# Patient Record
Sex: MALE | Race: WHITE | NOT HISPANIC OR LATINO | URBAN - METROPOLITAN AREA
[De-identification: names, ages, dates, MRNs, and addresses within clinical notes are randomized per-mention and may not be internally consistent; named-entity substitution may affect disease eponyms.]

---

## 2020-08-26 ENCOUNTER — APPOINTMENT (RX ONLY)
Dept: URBAN - METROPOLITAN AREA CLINIC 34 | Facility: CLINIC | Age: 25
Setting detail: DERMATOLOGY
End: 2020-08-26

## 2020-08-26 VITALS
TEMPERATURE: 97.3 F | DIASTOLIC BLOOD PRESSURE: 73 MMHG | SYSTOLIC BLOOD PRESSURE: 112 MMHG | WEIGHT: 169 LBS | HEIGHT: 70 IN | HEART RATE: 66 BPM

## 2020-08-26 DIAGNOSIS — B07.8 OTHER VIRAL WARTS: ICD-10-CM

## 2020-08-26 DIAGNOSIS — B07.0 PLANTAR WART: ICD-10-CM

## 2020-08-26 PROBLEM — Z11.59 ENCOUNTER FOR SCREENING FOR OTHER VIRAL DISEASES: Status: ACTIVE | Noted: 2020-08-26

## 2020-08-26 PROCEDURE — ? ADDITIONAL NOTES

## 2020-08-26 PROCEDURE — ? COUNSELING

## 2020-08-26 PROCEDURE — 99202 OFFICE O/P NEW SF 15 MIN: CPT

## 2020-08-26 ASSESSMENT — LOCATION ZONE DERM
LOCATION ZONE: HAND
LOCATION ZONE: FINGER
LOCATION ZONE: FEET

## 2020-08-26 ASSESSMENT — LOCATION SIMPLE DESCRIPTION DERM
LOCATION SIMPLE: LEFT HAND
LOCATION SIMPLE: LEFT FOOT
LOCATION SIMPLE: RIGHT THUMB
LOCATION SIMPLE: LEFT INDEX FINGER

## 2020-08-26 ASSESSMENT — LOCATION DETAILED DESCRIPTION DERM
LOCATION DETAILED: LEFT LATERAL PLANTAR FOOT
LOCATION DETAILED: PERIUNGUAL SKIN RIGHT THUMB
LOCATION DETAILED: LEFT PROXIMAL PALMAR INDEX FINGER
LOCATION DETAILED: LEFT RADIAL PALM

## 2020-08-26 NOTE — PROCEDURE: COUNSELING
Patient Specific Counseling (Will Not Stick From Patient To Patient): Reviewed treatment options see notes under verruca vulgaris.
Detail Level: Detailed
Patient Specific Counseling (Will Not Stick From Patient To Patient): Verruca types reviewed and process - also discussed how resilient these can be.  He has one on the foot . Also how they can spread.  He tried OTC only 1 x- and it didn't work - reviewed resiliency and persistence and time.  Discussed soaking and trimming/filing before treatments . \\n\\nSalicylic acid gel - Common warts in young children can be treated at home by their parents on a daily basis by applying salicylic acid gel, solution, or plaster. There is usually little discomfort, but it can take many weeks of treatment to obtain favorable results. Treatment should be stopped at least temporarily if the wart becomes sore.\\n\\nCantharidin - Warts may also be treated by painting with cantharidin in the dermatologists office. Cantharidin causes a blister to form under the wart. The dermatologist can then clip away the dead part of the wart on the blister roof in a week or so.\\n\\nCryotherapy - For adults and older children cryotherapy (freezing) is generally preferred. This treatment is not too painful and rarely results in scarring. However, repeat treatments at one to three week intervals are often necessary. \\n\\nElectrosurgery - This technique uses electrical current  (burning) resulting in physical destruction of the skin containing the virus. \\n\\nLaser - This treatment is considered for warts that have not responded to other therapies. Lasers use heat generated form light to cause physical destruction. There are several different lasers used for the treatment of warts. Lasers are more expensive and require the injection of a local anesthesia to numb the area treated.\\n\\nBleomycin -This agent is injected into each wart. It is an anti-cancer drug. The injections may be painful and can have other side effects.\\n\\nImmunotherapy  This therapy attempts to use the bodys own rejection system to eliminate the virus. Several methods of immunotherapy are being used. With one method, the patient is made allergic to a certain chemical, which is then painted on the wart. A mild allergic reaction occurs around the treated warts, and may result in the disappearance of the warts. \\nA.  Gluteraldehyde\\nB.  DNCB Dinitrochlorobenzene\\n\\Low this time he wishes to use over the counter and will return if things are not getting smaller with consistent use.  \\n\\nReminded to soak and trim prior to treatments.  Return in 3 months if not improving or wishes to alter plan can also return earlier.

## 2020-08-26 NOTE — HPI: SKIN LESION
Is This A New Presentation, Or A Follow-Up?: Skin Lesions
What Type Of Note Output Would You Prefer (Optional)?: Bullet Format
How Severe Is Your Skin Lesion?: moderate
Has Your Skin Lesion Been Treated?: not been treated
Additional History: Patient has used over the counter wart removal pads. He notes they kind of helped and started to go away but then came right back.